# Patient Record
Sex: MALE | Employment: OTHER | URBAN - METROPOLITAN AREA
[De-identification: names, ages, dates, MRNs, and addresses within clinical notes are randomized per-mention and may not be internally consistent; named-entity substitution may affect disease eponyms.]

---

## 2017-01-03 DIAGNOSIS — C61 MALIGNANT NEOPLASM OF PROSTATE (HCC): ICD-10-CM

## 2017-02-01 ENCOUNTER — HOSPITAL ENCOUNTER (OUTPATIENT)
Dept: LAB | Age: 72
Discharge: HOME | End: 2017-02-01
Payer: MEDICARE

## 2017-02-01 PROCEDURE — 82652 VIT D 1 25-DIHYDROXY: CPT | Performed by: RADIOLOGY

## 2017-02-01 PROCEDURE — 84154 ASSAY OF PSA FREE: CPT | Performed by: RADIOLOGY

## 2017-02-01 PROCEDURE — 84403 ASSAY OF TOTAL TESTOSTERONE: CPT | Performed by: RADIOLOGY

## 2017-02-01 PROCEDURE — 36415 COLL VENOUS BLD VENIPUNCTURE: CPT | Performed by: RADIOLOGY

## 2017-02-02 LAB
1,25(OH)2D3 SERPL-MCNC: 61.7 PG/ML (ref 19.9–79.3)
COMMENT, TESC2: ABNORMAL
PSA FREE MFR SERPL: 6 %
PSA FREE SERPL-MCNC: 0.06 NG/ML
PSA SERPL-MCNC: 1 NG/ML (ref 0–4)
TESTOST SERPL-MCNC: 248 NG/DL (ref 348–1197)

## 2017-02-15 ENCOUNTER — HOSPITAL ENCOUNTER (OUTPATIENT)
Dept: RADIATION THERAPY | Age: 72
Discharge: HOME | End: 2017-02-15
Payer: MEDICARE

## 2017-02-15 PROCEDURE — 99211 OFF/OP EST MAY X REQ PHY/QHP: CPT

## 2017-02-28 PROBLEM — R39.9 LOWER URINARY TRACT SYMPTOMS (LUTS): Status: ACTIVE | Noted: 2017-02-28

## 2017-06-19 ENCOUNTER — HOSPITAL ENCOUNTER (OUTPATIENT)
Dept: WOUND CARE | Age: 72
Discharge: HOME | End: 2017-06-19
Payer: MEDICARE

## 2017-06-19 VITALS
OXYGEN SATURATION: 99 % | HEART RATE: 71 BPM | DIASTOLIC BLOOD PRESSURE: 85 MMHG | TEMPERATURE: 98.2 F | RESPIRATION RATE: 16 BRPM | SYSTOLIC BLOOD PRESSURE: 177 MMHG

## 2017-06-19 PROCEDURE — G0277 HBOT, FULL BODY CHAMBER, 30M: HCPCS

## 2017-06-19 NOTE — HYPERBARIC MEDICINE NOTE
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 Vital Signs:   TEMP:98.3  Pulse: 70  RR : 16  BP : 158/85  02 Sat: 98    NAD:   EARS: TM WNL; TEED 0 B/L  LUNGS: Good Breath Sounds B/L; CTA  HEART: RRR NL S1S2 No S3 S4  LE: No edema    Post Treatment Note:   Tolerated TX well    Vital Signs  Pulse: 71  RR : 16  BP : 177/85  02 Sat: 99%      Next TX scheduled for 06/20/17        Luzmaria Gilbert M.D.   [facility]

## 2017-06-20 ENCOUNTER — HOSPITAL ENCOUNTER (OUTPATIENT)
Dept: WOUND CARE | Age: 72
Discharge: HOME | End: 2017-06-20
Payer: MEDICARE

## 2017-06-20 VITALS
RESPIRATION RATE: 16 BRPM | SYSTOLIC BLOOD PRESSURE: 166 MMHG | DIASTOLIC BLOOD PRESSURE: 86 MMHG | OXYGEN SATURATION: 99 % | HEART RATE: 62 BPM | TEMPERATURE: 97.9 F

## 2017-06-20 PROCEDURE — G0277 HBOT, FULL BODY CHAMBER, 30M: HCPCS

## 2017-06-20 NOTE — HYPERBARIC MEDICINE NOTE
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 Vital Signs:   TEMP:98.0  Pulse: 70  RR : 16  BP : 160/72  02 Sat: 98    NAD:   EARS: TM WNL; TEED 0 B/L  LUNGS: Good Breath Sounds B/L; CTA  HEART: RRR NL S1S2 No S3 S4  LE: No edema    Post Treatment Note:   Tolerated TX well    Vital Signs  Pulse: 62  RR : 16  BP : 166/86  02 Sat:99       Next TX scheduled for 06/21/17        Arjun Calvillo M.D.   [facility]

## 2017-06-21 ENCOUNTER — HOSPITAL ENCOUNTER (OUTPATIENT)
Dept: WOUND CARE | Age: 72
Discharge: HOME | End: 2017-06-21
Payer: MEDICARE

## 2017-06-21 VITALS
SYSTOLIC BLOOD PRESSURE: 160 MMHG | HEART RATE: 66 BPM | OXYGEN SATURATION: 99 % | TEMPERATURE: 97.5 F | RESPIRATION RATE: 16 BRPM | DIASTOLIC BLOOD PRESSURE: 86 MMHG

## 2017-06-21 PROCEDURE — G0277 HBOT, FULL BODY CHAMBER, 30M: HCPCS

## 2017-06-21 NOTE — HYPERBARIC MEDICINE NOTE
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 Vital Signs:   TEMP:98.0  Pulse: 70  RR : 16  BP : 160/72  02 Sat: 98    NAD:   EARS: TM WNL; TEED 0 B/L  LUNGS: Good Breath Sounds B/L; CTA  HEART: RRR NL S1S2 No S3 S4  LE: No edema    Post Treatment Note:   Tolerated TX well    Vital Signs  Pulse: 62  RR : 16  BP : 166/86  02 Sat:99       Next TX scheduled for 06/22/17        Lily Betts M.D.   [facility]

## 2017-06-22 ENCOUNTER — HOSPITAL ENCOUNTER (OUTPATIENT)
Dept: WOUND CARE | Age: 72
Discharge: HOME | End: 2017-06-22
Payer: MEDICARE

## 2017-06-22 VITALS
DIASTOLIC BLOOD PRESSURE: 84 MMHG | TEMPERATURE: 97.3 F | HEART RATE: 63 BPM | RESPIRATION RATE: 16 BRPM | OXYGEN SATURATION: 99 % | SYSTOLIC BLOOD PRESSURE: 158 MMHG

## 2017-06-22 PROCEDURE — G0277 HBOT, FULL BODY CHAMBER, 30M: HCPCS

## 2017-06-23 ENCOUNTER — HOSPITAL ENCOUNTER (OUTPATIENT)
Dept: WOUND CARE | Age: 72
Discharge: HOME | End: 2017-06-23
Payer: MEDICARE

## 2017-06-23 VITALS
HEART RATE: 67 BPM | RESPIRATION RATE: 16 BRPM | SYSTOLIC BLOOD PRESSURE: 160 MMHG | OXYGEN SATURATION: 99 % | TEMPERATURE: 97.5 F | DIASTOLIC BLOOD PRESSURE: 83 MMHG

## 2017-06-23 PROCEDURE — G0277 HBOT, FULL BODY CHAMBER, 30M: HCPCS

## 2017-06-23 NOTE — HYPERBARIC MEDICINE NOTE
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     Vital Signs:   TEMP:98.0  Pulse: 70  RR : 16  BP : 160/72  02 Sat: 98    NAD:   EARS: TM WNL; TEED 0 B/L  LUNGS: Good Breath Sounds B/L; CTA  HEART: RRR NL S1S2 No S3 S4  LE: No edema    Post Treatment Note:   Tolerated TX well    Vital Signs  Pulse: 62  RR : 16  BP : 166/86  02 Sat:99       Next TX scheduled for 06/26/17        Rom Kemp M.D.  [place] [id number] and [facility] Medicine

## 2017-06-26 ENCOUNTER — HOSPITAL ENCOUNTER (OUTPATIENT)
Dept: WOUND CARE | Age: 72
Discharge: HOME | End: 2017-06-26
Payer: MEDICARE

## 2017-06-26 ENCOUNTER — HOSPITAL ENCOUNTER (OUTPATIENT)
Dept: CT IMAGING | Age: 72
Discharge: HOME | End: 2017-06-26
Attending: INTERNAL MEDICINE
Payer: MEDICARE

## 2017-06-26 DIAGNOSIS — R51.9 HEAD ACHE: ICD-10-CM

## 2017-06-26 PROCEDURE — 70450 CT HEAD/BRAIN W/O DYE: CPT

## 2017-06-27 ENCOUNTER — HOSPITAL ENCOUNTER (OUTPATIENT)
Dept: WOUND CARE | Age: 72
Discharge: HOME | End: 2017-06-27
Payer: MEDICARE

## 2017-06-27 VITALS
DIASTOLIC BLOOD PRESSURE: 87 MMHG | HEART RATE: 65 BPM | SYSTOLIC BLOOD PRESSURE: 167 MMHG | TEMPERATURE: 97.6 F | RESPIRATION RATE: 16 BRPM | OXYGEN SATURATION: 99 %

## 2017-06-27 PROCEDURE — G0277 HBOT, FULL BODY CHAMBER, 30M: HCPCS

## 2017-06-27 NOTE — HYPERBARIC MEDICINE NOTE
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 Headaches are better; tolerating amlodipine  No visual hematuria or clots; good flow    Brain CT negative      Vital Signs:   TEMP:97.7  Pulse: 71  RR : 16  BP : 159/85  02 Sat: 98    NAD:   EARS: TM WNL; TEED 0 B/L  LUNGS: Good Breath Sounds B/L; CTA  HEART: RRR NL S1S2 No S3 S4  LE: No edema    Post Treatment Note:   Tolerated TX well    Vital Signs  Pulse: 65  RR : 16  BP : 167/87  02 Sat: 99      Next TX scheduled for 06/28/17        aSmra Rainey M.D.   Hyperbaric Medicine

## 2017-06-28 ENCOUNTER — HOSPITAL ENCOUNTER (OUTPATIENT)
Dept: WOUND CARE | Age: 72
Discharge: HOME | End: 2017-06-28
Payer: MEDICARE

## 2017-06-28 VITALS
SYSTOLIC BLOOD PRESSURE: 162 MMHG | TEMPERATURE: 97.7 F | DIASTOLIC BLOOD PRESSURE: 85 MMHG | RESPIRATION RATE: 16 BRPM | OXYGEN SATURATION: 99 % | HEART RATE: 67 BPM

## 2017-06-28 PROCEDURE — G0277 HBOT, FULL BODY CHAMBER, 30M: HCPCS

## 2017-06-28 NOTE — HYPERBARIC MEDICINE NOTE
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     Vital Signs:   TEMP:98.0  Pulse: 70  RR : 16  BP : 160/72  02 Sat: 98    NAD:   EARS: TM WNL; TEED 0 B/L  LUNGS: Good Breath Sounds B/L; CTA  HEART: RRR NL S1S2 No S3 S4  LE: No edema    Post Treatment Note:   Tolerated TX well    Vital Signs  Pulse: 62  RR : 16  BP : 166/86  02 Sat:99       Next TX scheduled for 06/29/17        Niya Houston M.D.  [place] [id number] and Hyperbaric Medicine

## 2017-06-29 ENCOUNTER — HOSPITAL ENCOUNTER (OUTPATIENT)
Dept: WOUND CARE | Age: 72
Discharge: HOME | End: 2017-06-29
Payer: MEDICARE

## 2017-06-29 VITALS
DIASTOLIC BLOOD PRESSURE: 88 MMHG | SYSTOLIC BLOOD PRESSURE: 158 MMHG | HEART RATE: 67 BPM | OXYGEN SATURATION: 99 % | RESPIRATION RATE: 16 BRPM | TEMPERATURE: 97.6 F

## 2017-06-29 PROCEDURE — G0277 HBOT, FULL BODY CHAMBER, 30M: HCPCS

## 2017-06-29 NOTE — HYPERBARIC MEDICINE NOTE
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 No H/A  No visual hematuria or clots; flow better    Vital Signs:   TEMP:97.5  Pulse: 78  RR : 16  BP : 150/79  02 Sat: 98    NAD:   EARS: TM WNL; TEED 0 B/L  LUNGS: Good Breath Sounds B/L; CTA  HEART: RRR NL S1S2 No S3 S4  LE: No edema    Post Treatment Note:   Tolerated TX well    Vital Signs  Pulse: 67  RR : 16  BP : 158/88  02 Sat: 99      Next TX scheduled for 06/30/17        Uday Watkins M.D.   [facility]

## 2017-06-30 ENCOUNTER — HOSPITAL ENCOUNTER (OUTPATIENT)
Dept: WOUND CARE | Age: 72
Discharge: HOME | End: 2017-06-30
Payer: MEDICARE

## 2017-06-30 VITALS
HEART RATE: 67 BPM | OXYGEN SATURATION: 99 % | RESPIRATION RATE: 16 BRPM | SYSTOLIC BLOOD PRESSURE: 161 MMHG | DIASTOLIC BLOOD PRESSURE: 89 MMHG | TEMPERATURE: 98 F

## 2017-06-30 PROCEDURE — G0277 HBOT, FULL BODY CHAMBER, 30M: HCPCS

## 2017-06-30 NOTE — HYPERBARIC MEDICINE NOTE
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     Vital Signs:   TEMP:98.0  Pulse: 70  RR : 16  BP : 160/72  02 Sat: 98     NAD:   EARS: TM WNL; TEED 0 B/L  LUNGS: Good Breath Sounds B/L; CTA  HEART: RRR NL S1S2 No S3 S4  LE: No edema     Post Treatment Note:   Tolerated TX well     Vital Signs  Pulse: 62  RR : 16  BP : 166/86  02 Sat:99         Next TX scheduled for 7/3/17           Elio Whaley M.D.  [place] [address] and Hyperbaric Medicine

## 2017-07-17 ENCOUNTER — HOSPITAL ENCOUNTER (OUTPATIENT)
Dept: WOUND CARE | Age: 72
Discharge: HOME | End: 2017-07-17
Payer: MEDICARE

## 2017-07-17 VITALS
HEART RATE: 73 BPM | RESPIRATION RATE: 16 BRPM | OXYGEN SATURATION: 99 % | TEMPERATURE: 97.9 F | DIASTOLIC BLOOD PRESSURE: 95 MMHG | SYSTOLIC BLOOD PRESSURE: 190 MMHG

## 2017-07-17 PROCEDURE — G0277 HBOT, FULL BODY CHAMBER, 30M: HCPCS

## 2017-07-18 ENCOUNTER — APPOINTMENT (OUTPATIENT)
Dept: WOUND CARE | Age: 72
End: 2017-07-18
Payer: MEDICARE

## 2017-07-18 ENCOUNTER — HOSPITAL ENCOUNTER (OUTPATIENT)
Dept: WOUND CARE | Age: 72
Discharge: HOME | End: 2017-07-18
Payer: MEDICARE

## 2017-07-18 NOTE — HYPERBARIC MEDICINE NOTE
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 Some visual hematuria at the initiation of micturition . No blood clots    Vital Signs:   TEMP:97.9  Pulse: 74  RR : 16  BP : 162/75  02 Sat: 99    NAD:   EARS: TM WNL; TEED 0 B/L  LUNGS: Good Breath Sounds B/L; CTA  HEART: RRR NL S1S2 No S3 S4  LE: No edema    Post Treatment Note:   Tolerated TX well    Vital Signs  Pulse: 73  RR : 16  BP : 190/95  02 Sat: 99      Next TX scheduled for 07/18/17        Chaz Knutson M.D.   [facility]

## 2017-07-19 ENCOUNTER — HOSPITAL ENCOUNTER (OUTPATIENT)
Dept: WOUND CARE | Age: 72
Discharge: HOME | End: 2017-07-19
Payer: MEDICARE

## 2017-07-19 VITALS
OXYGEN SATURATION: 99 % | HEART RATE: 69 BPM | TEMPERATURE: 98.7 F | DIASTOLIC BLOOD PRESSURE: 86 MMHG | SYSTOLIC BLOOD PRESSURE: 185 MMHG | RESPIRATION RATE: 16 BRPM

## 2017-07-19 PROCEDURE — G0277 HBOT, FULL BODY CHAMBER, 30M: HCPCS

## 2017-07-19 NOTE — HYPERBARIC MEDICINE NOTE
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     Vital Signs:   TEMP:98.0  Pulse: 70  RR : 16  BP : 160/72  02 Sat: 98    NAD:   EARS: TM WNL; TEED 0 B/L  LUNGS: Good Breath Sounds B/L; CTA  HEART: RRR NL S1S2 No S3 S4  LE: No edema    Post Treatment Note:   Tolerated TX well    Vital Signs  Pulse: 62  RR : 16  BP : 166/86  02 Sat:99       Next TX scheduled for 07/20/17        Zaina Thrasher M.D.  [place] [address] and Hyperbaric Medicine

## 2017-07-20 ENCOUNTER — HOSPITAL ENCOUNTER (OUTPATIENT)
Dept: WOUND CARE | Age: 72
Discharge: HOME | End: 2017-07-20
Payer: MEDICARE

## 2017-07-20 VITALS
SYSTOLIC BLOOD PRESSURE: 185 MMHG | RESPIRATION RATE: 16 BRPM | OXYGEN SATURATION: 99 % | DIASTOLIC BLOOD PRESSURE: 58 MMHG | HEART RATE: 74 BPM | TEMPERATURE: 97.7 F

## 2017-07-20 PROCEDURE — G0277 HBOT, FULL BODY CHAMBER, 30M: HCPCS

## 2017-07-20 NOTE — HYPERBARIC MEDICINE NOTE
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 Still some hematuria at the initiation of micturition; about 60% of the time. GUF. No blood clots; plan for additional 10 treatments after initial 30 treatments;   these will be provided in [place]    Vital Signs:   TEMP:97.8  Pulse: 84  RR : 16  BP : 168/76  02 Sat: 98    NAD:   EARS: TM WNL; TEED 0 B/L  LUNGS: Good Breath Sounds B/L; CTA  HEART: RRR NL S1S2 No S3 S4  LE: No edema    Post Treatment Note:   Tolerated TX well    Vital Signs  Pulse: 74  RR : 16  BP : 185/88  02 Sat: 99      Next TX scheduled for 07/21/17        Aimee Eddy M.D.   [facility]

## 2017-07-21 ENCOUNTER — HOSPITAL ENCOUNTER (OUTPATIENT)
Dept: WOUND CARE | Age: 72
End: 2017-07-21
Payer: MEDICARE

## 2017-07-24 ENCOUNTER — HOSPITAL ENCOUNTER (OUTPATIENT)
Dept: WOUND CARE | Age: 72
Discharge: HOME | End: 2017-07-24
Payer: MEDICARE

## 2017-07-24 VITALS
SYSTOLIC BLOOD PRESSURE: 151 MMHG | OXYGEN SATURATION: 99 % | HEART RATE: 68 BPM | DIASTOLIC BLOOD PRESSURE: 83 MMHG | RESPIRATION RATE: 16 BRPM | TEMPERATURE: 97.7 F

## 2017-07-24 PROCEDURE — G0277 HBOT, FULL BODY CHAMBER, 30M: HCPCS

## 2017-07-24 NOTE — HYPERBARIC MEDICINE NOTE
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 Some visual hematuria at the initiation of micturition  No urethritis symptoms  No blood clots    plan for additional 10 treatments after initial 30 treatments;  these will be provided in [place]    Vital Signs:   TEMP:97.8  Pulse: 73  RR : 16  BP : 157/76  02 Sat: 98    NAD:   EARS: TM WNL; TEED 0 B/L  LUNGS: Good Breath Sounds B/L; CTA  HEART: RRR NL S1S2 No S3 S4  LE: No edema    Post Treatment Note:   Tolerated TX well    Vital Signs  Pulse: 68  RR : 16  BP : 151/83  02 Sat: 99      Next TX scheduled for 07/25/17        Maria Main M.D.   [facility]

## 2017-07-25 ENCOUNTER — HOSPITAL ENCOUNTER (OUTPATIENT)
Dept: WOUND CARE | Age: 72
Discharge: HOME | End: 2017-07-25
Payer: MEDICARE

## 2017-07-25 VITALS
HEART RATE: 75 BPM | DIASTOLIC BLOOD PRESSURE: 79 MMHG | SYSTOLIC BLOOD PRESSURE: 156 MMHG | RESPIRATION RATE: 16 BRPM | TEMPERATURE: 98.5 F | OXYGEN SATURATION: 98 %

## 2017-07-25 PROCEDURE — G0277 HBOT, FULL BODY CHAMBER, 30M: HCPCS

## 2017-07-25 NOTE — HYPERBARIC MEDICINE NOTE
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        Some visual hematuria at the initiation of micturition  No urethritis symptoms  No blood clots     additional 10 treatments; to be provided in NC     Vital Signs:   TEMP:98.4  Pulse: 84  RR : 16  BP : 145/75  02 Sat: 98     NAD:   EARS: TM WNL; TEED 0 B/L  LUNGS: Good Breath Sounds B/L; CTA  HEART: RRR NL S1S2 No S3 S4  LE: No edema     Post Treatment Note:   Tolerated TX well     Vital Signs  Pulse: 94  RR : 16  BP : 175/88  02 Sat: 99        Next TX scheduled for 07/26/17           Rom Mclaughlin M.D.   Hyperbaric Medicine

## 2017-07-26 ENCOUNTER — HOSPITAL ENCOUNTER (OUTPATIENT)
Dept: WOUND CARE | Age: 72
Discharge: HOME | End: 2017-07-26
Payer: MEDICARE

## 2017-07-26 VITALS
HEART RATE: 77 BPM | DIASTOLIC BLOOD PRESSURE: 90 MMHG | OXYGEN SATURATION: 99 % | RESPIRATION RATE: 16 BRPM | SYSTOLIC BLOOD PRESSURE: 186 MMHG | TEMPERATURE: 97.8 F

## 2017-07-26 PROCEDURE — G0277 HBOT, FULL BODY CHAMBER, 30M: HCPCS

## 2017-07-26 NOTE — HYPERBARIC MEDICINE NOTE
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     Vital Signs:   TEMP:98.0  Pulse: 70  RR : 16  BP : 160/72  02 Sat: 98    NAD:   EARS: TM WNL; TEED 0 B/L  LUNGS: Good Breath Sounds B/L; CTA  HEART: RRR NL S1S2 No S3 S4  LE: No edema    Post Treatment Note:   Tolerated TX well    Vital Signs  Pulse: 62  RR : 16  BP : 166/86  02 Sat:99       Next TX scheduled for 07/27/17        Alena Vigil M.D.  [place] [address] and Hyperbaric Medicine

## 2017-07-27 ENCOUNTER — HOSPITAL ENCOUNTER (OUTPATIENT)
Dept: WOUND CARE | Age: 72
Discharge: HOME | End: 2017-07-27
Payer: MEDICARE

## 2017-07-27 VITALS
RESPIRATION RATE: 16 BRPM | TEMPERATURE: 97.7 F | DIASTOLIC BLOOD PRESSURE: 89 MMHG | HEART RATE: 66 BPM | SYSTOLIC BLOOD PRESSURE: 169 MMHG | OXYGEN SATURATION: 99 %

## 2017-07-27 PROCEDURE — G0277 HBOT, FULL BODY CHAMBER, 30M: HCPCS

## 2017-07-27 NOTE — HYPERBARIC MEDICINE NOTE
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        Some visual hematuria at the initiation of micturition  No urethritis symptoms  No blood clots     additional 10 treatments; to be provided in NC     Vital Signs:   TEMP:97.3  Pulse: 75  RR : 16  BP : 161/79  02 Sat: 98     NAD:   EARS: TM WNL; TEED 0 B/L  LUNGS: Good Breath Sounds B/L; CTA  HEART: RRR NL S1S2 No S3 S4  LE: No edema     Post Treatment Note:   Tolerated TX well     Vital Signs  Pulse:   RR :   BP :   02 Sat:     Amlodipine increased 10 mg po daily; Rx. # 30 Refill 5        Next TX scheduled for 07/28/17           Isaiah Quintero M.D.   [facility]

## 2017-07-28 ENCOUNTER — HOSPITAL ENCOUNTER (OUTPATIENT)
Dept: WOUND CARE | Age: 72
Discharge: HOME | End: 2017-07-28
Payer: MEDICARE

## 2017-07-28 VITALS
OXYGEN SATURATION: 99 % | HEART RATE: 70 BPM | RESPIRATION RATE: 16 BRPM | DIASTOLIC BLOOD PRESSURE: 86 MMHG | TEMPERATURE: 97.7 F | SYSTOLIC BLOOD PRESSURE: 174 MMHG

## 2017-07-28 PROCEDURE — G0277 HBOT, FULL BODY CHAMBER, 30M: HCPCS

## 2017-07-28 NOTE — HYPERBARIC MEDICINE NOTE
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     Vital Signs:   TEMP:98.0  Pulse: 70  RR : 16  BP : 160/72  02 Sat: 98    NAD:   EARS: TM WNL; TEED 0 B/L  LUNGS: Good Breath Sounds B/L; CTA  HEART: RRR NL S1S2 No S3 S4  LE: No edema    Post Treatment Note:   Tolerated TX well    Vital Signs  Pulse: 62  RR : 16  BP : 166/86  02 Sat:99       Next TX scheduled for 07/31/17        Geraldo Caro M.D.  [place] [address] and Hyperbaric Medicine

## 2017-09-15 NOTE — HYPERBARIC MEDICINE NOTE
LATE ENTRY/CLARIFICATION on 9/15/17    Hyperbaric Oxygen Therapy (HBO) goal is to potentially reverse the long term tissue damage caused by radiation and to preserve the function of organs affected, in this case the bladder. Patient presents with gross hematuria secondary to Radiation Cystitis. Protocol is 2. 5ATA for 90 minutes of 100% oxygen breathing based upon availablity of air break capabilities. Initial order for hyperbaric is 30 treatments at 2. 5ATA with two 5-minute air breaks. Patient will undergo hyperbarics daily Monday-Friday. Resolution of hematuria will be assessed on a weekly basis both subjectively and objectively. Often, the resolution is seen but is slow and therefore treatments are continued for another 30 days. In addition to the benefits, it improves cost effectiveness of the overall treatment plan. Discussed with patient the benefits and risks associated with hyperbarics and the patient would like to proceed with treatment.

## 2019-04-23 ENCOUNTER — IMPORTED ENCOUNTER (OUTPATIENT)
Dept: URBAN - METROPOLITAN AREA CLINIC 1 | Facility: CLINIC | Age: 74
End: 2019-04-23

## 2019-04-23 PROBLEM — H25.813: Noted: 2019-04-23

## 2019-04-23 PROBLEM — H17.821: Noted: 2019-04-23

## 2019-04-23 PROCEDURE — 92015 DETERMINE REFRACTIVE STATE: CPT

## 2019-04-23 PROCEDURE — 92004 COMPRE OPH EXAM NEW PT 1/>: CPT

## 2019-04-23 NOTE — PATIENT DISCUSSION
1.  Cataract OU:  Visually Significant secondary to glare discussed the risks benefits alternatives and limitations of cataract surgery. The patient stated a full understanding and a desire to proceed with the procedure. The patient will need to return for preop appointment with cataract measurements and to have any additional questions answered and start pre-operative eye drops as directed. Guarded visual prognosis due to history of refractive surgery. The accuracy of the IOL selection may be affected by prior refractive surgery and patient should expect to wear glasses after [place]. Thoroughly discussed with patient patient understood. [place] PCL OS then OD Otherwise follow-up 6 months 10/DFE/glare 2. Peripheral Corneal Scar OD - Observe 3. H/o CK OS - ([place]) Will try to obtain old records prior to [address]. Patient is Near sighted OD Distance OS (Patient is right handed) Return for an appointment for Ascan/H and P. with Dr. Clarence Piedra.

## 2022-04-02 ASSESSMENT — TONOMETRY
OS_IOP_MMHG: 15
OD_IOP_MMHG: 15

## 2022-04-02 ASSESSMENT — VISUAL ACUITY
OS_GLARE: 20/50
OD_SC: J1
OS_CC: 20/20
OD_GLARE: 20/200
OD_CC: 20/200